# Patient Record
Sex: FEMALE | Race: WHITE
[De-identification: names, ages, dates, MRNs, and addresses within clinical notes are randomized per-mention and may not be internally consistent; named-entity substitution may affect disease eponyms.]

---

## 2017-06-29 ENCOUNTER — HOSPITAL ENCOUNTER (EMERGENCY)
Dept: HOSPITAL 80 - FED | Age: 32
Discharge: HOME | End: 2017-06-29
Payer: COMMERCIAL

## 2017-06-29 VITALS
SYSTOLIC BLOOD PRESSURE: 110 MMHG | RESPIRATION RATE: 16 BRPM | OXYGEN SATURATION: 96 % | DIASTOLIC BLOOD PRESSURE: 58 MMHG | HEART RATE: 60 BPM

## 2017-06-29 VITALS — TEMPERATURE: 98.1 F

## 2017-06-29 DIAGNOSIS — K59.00: ICD-10-CM

## 2017-06-29 DIAGNOSIS — N28.9: Primary | ICD-10-CM

## 2017-06-29 DIAGNOSIS — N20.0: ICD-10-CM

## 2017-06-29 LAB
COLOR UR: (no result)
NITRITE UR QL STRIP: NEGATIVE
PH UR STRIP: 7 [PH] (ref 5–7.5)
RBC #/AREA URNS HPF: (no result) /HPF (ref 0–3)
SP GR UR STRIP: 1 (ref 1–1.03)
WBC #/AREA URNS HPF: (no result) /HPF (ref 0–3)

## 2017-06-29 NOTE — EDPHY
H & P


Smoking Status: Never smoked


Time Seen by Provider: 06/29/17 13:00


HPI/ROS: 





HPI:  Roxanne New is a  32 yrs, female who presents with


Chief Complaint: numbness of lateral knee 





Location: bilateral lower legs 


Quality: numbness


Duration: 1 week 


Signs and Symptoms: no fever, no chills, + midline lower back aching, no 

incontinence, no weakness, no dysuria, no vaginal discharge, + urinary 

frequency 


Timing: sudden, constant 


Severity: mild to moderate 


Context: complaints of left lower leg > right light lower leg concentrated at 

knee along with bilateral feet numbness x 1 week. denies raditation of pain 

from lower back. does complain of midline lumbar achiness. Patient brought 

workup from PCP, Aletha Brower including UA 6/29/17 normal, cbc/cmp 

unremarkable except Creatinine 1.4, ESR 5, TSH 1.65. autoimmune panel ordered 

but results pending. denies injury or recent increased activity. does drink 

protein shakes once daily. sent by PCP for renal ultrasound. 


Modifying Factors:  increased fluid intake last few days 








REVIEW OF SYSTEMS:


Constitutional:  No fever


Eyes:  No blurred vision


Respiratory:  No shortness of breath, no cough


Cardiovascular:  No chest pain


Gastrointestinal:  No nausea, no vomiting no diarrhea 


Genitourinary:  No dysuria 


Extremities:  No myalgias 


Neurologic:  No weakness, no tingling


Skin:  No rashes


Hematologic:  No bruising, no bleeding





 (Kateryna Garcia)


Past Medical/Surgical History: 





Hashimoto, anxiety, has IUD (Kateryna Garcia)


Physical Exam: 








General Appearance:  middle aged physically fit white well appearing female, 

cooperative, Alert, no distress.


Eyes:  Pupils equal and round no pallor or injection.


ENT, Mouth:  Mucous membranes moist.


Respiratory:  There are no retractions, lungs are clear to auscultation.


Cardiovascular:  Regular rate and rhythm.


Gastrointestinal:  Abdomen is soft and nontender, no masses, bowel sounds 

normal.


Neurological:  GCS 15. 1/2 decreased sensation lateral aspect of left knee. 

left knee full extension, flexion 120 degrees, stable ligamentous exam, no 

effusion. no decreased sensation right lateral knee full extension, flexion 120 

degrees, stable ligamentous exam, no effusion.


Skin:  Warm and dry, no rashes.


BACK: mild lumbar midline tenderness to palpation, flexion/extension/rotation 

FROM and no pain with movement, no pain with SLR, no deformities, no 

paraspinous spasm. DTR 2/2


Extremities: 2/2 pulses, symmetrical, full range of motion.


Psychiatric:  Patient is oriented X 3, there is no agitation.


 (Kateryna Garcia)


Constitutional: 





 Initial Vital Signs











Temperature (C)  36.7 C   06/29/17 12:29


 


Heart Rate  62   06/29/17 12:29


 


Respiratory Rate  17   06/29/17 12:29


 


Blood Pressure  108/82 H  06/29/17 12:29


 


O2 Sat (%)  100   06/29/17 12:29








 











O2 Delivery Mode               Room Air














Allergies/Adverse Reactions: 


 





doxycycline [Doxycycline] Allergy (Mild, Verified 06/29/17 12:25)


 








Home Medications: 














 Medication  Instructions  Recorded


 


Adderall 10 MG (*)  11/13/16


 


Wellbutrin Sr  11/13/16


 


Diflucan  06/29/17


 


Gluthione  06/29/17


 


Levomefolate Calcium  06/29/17


 


Levornorgestrel  06/29/17


 


TRETINOIN  06/29/17


 


buPROPion  06/29/17


 


busPIRone  06/29/17














Medical Decision Making





- Diagnostics


Imaging Results: 





 Imaging Impressions





Lumbar Spine X-Ray  06/29/17 13:11


Impression: 1. Normal lumbar spine without instability.


2. Constipation.


3. Right nephrolithiasis.


 


    








Abdomen/Pelvis Ultrasound  06/29/17 13:16


Impression: 1. Consistent with medical renal disease. No obstruction.


2. Nonobstructive right lower pole nephrolithiasis.











ED Course/Re-evaluation: 





Renal US, UA and LS xray ordered


has IUD and currently menstruating; no pregnancy test ordered


will not repeat labs


UA 1+ bacteria, no protein, no infection, no glucose


LS xray my read no DDD, + constipation


Renal US: + nephrolithiasis and medical renal disease; nephrology follow up 

outpatient 


printed renal us for patient to give to PCP 


no signs of neurovascular compromise/cauda equina syndrome/proteinuria/

vasculitis/hydronephrosis/pyelonephritis (Kateryna Garcia)





The this patient was seen and examined by me.  Abdomen is soft and nontender. I 

agree with the assessment and plan. (Bailey Allan)


Differential Diagnosis: 





Back pain including but not limited to muscular pain, herniated disc, spine 

fracture, intra-abdominal causes and urinary tract infection. (Kateryna Garcia)





- Data Points


Laboratory Results: 





 











  06/29/17





  12:30


 


Urine Color  PALE YELLOW 





  


 


Urine Appearance  CLEAR 





  


 


Urine pH  7.0 





   (5.0-7.5) 


 


Ur Specific Gravity  1.003 





   (1.002-1.030) 


 


Urine Protein  NEGATIVE 





   (NEGATIVE) 


 


Urine Ketones  NEGATIVE 





   (NEGATIVE) 


 


Urine Blood  1+  H 





   (NEGATIVE) 


 


Urine Nitrate  NEGATIVE 





   (NEGATIVE) 


 


Urine Bilirubin  NEGATIVE 





   (NEGATIVE) 


 


Urine Urobilinogen  NEGATIVE EU EU





   (0.2-1.0) 


 


Ur Leukocyte Esterase  NEGATIVE 





   (NEGATIVE) 


 


Urine RBC  1-3 /hpf /hpf





   (0-3) 


 


Urine WBC  1-3 /hpf /hpf





   (0-3) 


 


Ur Epithelial Cells  TRACE /lpf /lpf





   (NONE-1+) 


 


Urine Glucose  NEGATIVE 





   (NEGATIVE) 














Departure





- Departure


Disposition: Home, Routine, Self-Care


Clinical Impression: 


 Acute renal insufficiency, Nephrolithiasis





Constipation


Qualifiers:


 Constipation type: unspecified constipation type Qualified Code(s): K59.00 - 

Constipation, unspecified





Condition: Good


Instructions:  Constipation (ED), Kidney Stones (ED), Paresthesia (ED)


Additional Instructions: 


Follow up with Nephrology in 1-2 weeks for Ultrasound showing medical renal 

disease. 


Referrals: 


GARRISON MIRELES [Other] - As per Instructions


Tim Hansen MD [Medical Doctor] - As per Instructions

## 2019-01-09 ENCOUNTER — HOSPITAL ENCOUNTER (OUTPATIENT)
Dept: HOSPITAL 80 - FIMAGING | Age: 34
Discharge: HOME | End: 2019-01-09
Attending: GENERAL ACUTE CARE HOSPITAL
Payer: COMMERCIAL

## 2019-01-09 DIAGNOSIS — E04.1: Primary | ICD-10-CM

## 2019-06-15 ENCOUNTER — HOSPITAL ENCOUNTER (EMERGENCY)
Dept: HOSPITAL 80 - FED | Age: 34
Discharge: HOME | End: 2019-06-15
Payer: COMMERCIAL

## 2019-06-27 ENCOUNTER — HOSPITAL ENCOUNTER (EMERGENCY)
Dept: HOSPITAL 80 - FED | Age: 34
LOS: 1 days | Discharge: HOME | End: 2019-06-28
Payer: COMMERCIAL